# Patient Record
(demographics unavailable — no encounter records)

---

## 2024-12-16 NOTE — PHYSICAL EXAM
[de-identified] : Right Upper Extremity o Shoulder :   Inspection/Palpation : no tenderness, no swelling, no deformities  Range of Motion : ACTIVE FORWARD ELEVATION: Measured at 80 degrees, ACTIVE EXTERNAL ROTATION: Measured at 30 degrees, ACTIVE INTERNAL ROTATION: Measured at L5.  Strength : external rotation 5/5, internal rotation 5/5, pain with supraspinatus 5/5  Stability : mild apprehension on ABER  Tests/Signs : Neer (-), Josue (+ mild), Sulcus (-)  o Upper Arm : no tenderness, no swelling, no deformities o Muscle Bulk : no atrophy o Sensation : sensation intact to light touch o Skin : no skin rash or discoloration o Vascular Exam : no edema, no cyanosis, radial and ulnar pulses normal No hyperlaxity [de-identified] : Procedure was performed at the [facility]  EXAM: SHOULDER RIGHT  PROCEDURE DATE: 12/14/2024  INTERPRETATION: HISTORY: Arthralgia of right shoulder region  TECHNIQUE: 3 views of the right shoulder.  COMPARISON: None  IMPRESSION:  No acute displaced fracture or dislocation. Joint spaces are normal.  PARESH PAGAN M.D., ATTENDING RADIOLOGIST This document has been electronically signed. Dec 14, 2024, 4:19PM

## 2024-12-16 NOTE — HISTORY OF PRESENT ILLNESS
[de-identified] : Mr. AYAKA SOLIS is a RHD 18 year male who presents to office complaining of right shoulder pain x 2 days. He was lifting weights with a 55 lb dumbbell when he felt a "pop" in his shoulder. He is not sure exactly where the pop was felt but highlights his posterior shoulder area as to wear it currently hurts him the most. He proceeded to go to a [facility] urgent care shortly thereafter where x-rays of the shoulder were performed showing no acute or chronic findings. He was placed in a shoulder sling and diagnosed with a suspected rotator cuff tear. He was advised to refrain from any activities involving his right shoulder and to follow up with an orthopedist. Patient notes a constant, aching, throbbing pain generally around his right shoulder which is worsened with attempted overhead movement or lifting. Denies radiating pain or distal neuropathy. He has tried rest, ice, heat, Tylenol, and Advil, which have mildly help to reduce his pain.  All review of systems, family history, social history, surgical history, past medical history, medications, and allergies not previously stated as positive are negative. They were reviewed by me today with the patient and documented accordingly.

## 2024-12-16 NOTE — HISTORY OF PRESENT ILLNESS
[de-identified] : Mr. AYAKA SOLIS is a RHD 18 year male who presents to office complaining of right shoulder pain x 2 days. He was lifting weights with a 55 lb dumbbell when he felt a "pop" in his shoulder. He is not sure exactly where the pop was felt but highlights his posterior shoulder area as to wear it currently hurts him the most. He proceeded to go to a [facility] urgent care shortly thereafter where x-rays of the shoulder were performed showing no acute or chronic findings. He was placed in a shoulder sling and diagnosed with a suspected rotator cuff tear. He was advised to refrain from any activities involving his right shoulder and to follow up with an orthopedist. Patient notes a constant, aching, throbbing pain generally around his right shoulder which is worsened with attempted overhead movement or lifting. Denies radiating pain or distal neuropathy. He has tried rest, ice, heat, Tylenol, and Advil, which have mildly help to reduce his pain.  All review of systems, family history, social history, surgical history, past medical history, medications, and allergies not previously stated as positive are negative. They were reviewed by me today with the patient and documented accordingly.

## 2024-12-16 NOTE — ADDENDUM
[FreeTextEntry1] : I, Daniella Solis, acted solely as a scribe for Dr. Kane Lang on this date 12/16/2024.

## 2024-12-16 NOTE — PHYSICAL EXAM
[de-identified] : Right Upper Extremity o Shoulder :   Inspection/Palpation : no tenderness, no swelling, no deformities  Range of Motion : ACTIVE FORWARD ELEVATION: Measured at 80 degrees, ACTIVE EXTERNAL ROTATION: Measured at 30 degrees, ACTIVE INTERNAL ROTATION: Measured at L5.  Strength : external rotation 5/5, internal rotation 5/5, pain with supraspinatus 5/5  Stability : mild apprehension on ABER  Tests/Signs : Neer (-), Josue (+ mild), Sulcus (-)  o Upper Arm : no tenderness, no swelling, no deformities o Muscle Bulk : no atrophy o Sensation : sensation intact to light touch o Skin : no skin rash or discoloration o Vascular Exam : no edema, no cyanosis, radial and ulnar pulses normal No hyperlaxity [de-identified] : Procedure was performed at the [facility]  EXAM: SHOULDER RIGHT  PROCEDURE DATE: 12/14/2024  INTERPRETATION: HISTORY: Arthralgia of right shoulder region  TECHNIQUE: 3 views of the right shoulder.  COMPARISON: None  IMPRESSION:  No acute displaced fracture or dislocation. Joint spaces are normal.  PARESH PAGAN M.D., ATTENDING RADIOLOGIST This document has been electronically signed. Dec 14, 2024, 4:19PM

## 2024-12-16 NOTE — DISCUSSION/SUMMARY
[de-identified] : The underlying pathophysiology was reviewed in great detail with the patient as well as the various treatment options, including ice, analgesics, NSAIDS, Physical therapy, and surgery. - Ordered MRI right shoulder r/o labral tear - Refer to PT A prescription was given for physical therapy.  F/U after MRI

## 2024-12-16 NOTE — DISCUSSION/SUMMARY
[de-identified] : The underlying pathophysiology was reviewed in great detail with the patient as well as the various treatment options, including ice, analgesics, NSAIDS, Physical therapy, and surgery. - Ordered MRI right shoulder r/o labral tear - Refer to PT A prescription was given for physical therapy.  F/U after MRI

## 2024-12-20 NOTE — HISTORY OF PRESENT ILLNESS
[(# ___since the last visit)] : [unfilled] visits to the emergency room since the last visit [(# ___ since the last visit)] : hospitalized [unfilled] times since the last visit [0 x/month] : 0 x/month [Minor Limitation] : minor limitation [< or = 2 days/wk] : < than or = 2 days/week [0 - 1/year] : 0 - 1/year [> or = 20] : > than or = 20 [FreeTextEntry1] : Mild persistent asthma, exercise-induced bronchospasm, allergic rhinitis, Seasonal allergies.  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  Last allergy eval 2017, Dr. White: +pollen. Immunotherapy considered.  COVID 19+ August 2024, received OCS.  PNA Oct 2024, received ABX and OCS.  Mild scoliosis: noted by PCP Jan 2024.    FOLLOW UP: Dec 20, 2024 Last seen July 2024 - Recs: Hold Advair for remainder of the summer. Continue daily Singulair 10mg. Sept 1st : restart Advair 115/21 2 puffs Qday. Albuterol PRN and pre-exercise. Flonase x3 weeks.    Interval hx: - Did well off ICS for summer.  - Restarted Aug 18th 2024 - Dx with COVID 19 8/31/24. Received OCS. Seen in .  - Dx with bronchitis 9/10/24 - Dr. Jacobs advised increasing Advair 115/21 2 puffs BID for the next 1-2 months.  - Dx with PNA 10/2/24 (LLL, per father) tx with ABX and OCS. Seen by school physician.  - No interval ER visits or hospital admissions.  - Overall doing well, however cough and SOB noted now more frequently during soccer since August 2024. Using ProAir respiclick prior to strenuous exercise but having more symptoms than in the past.  - No current chest pain or SOB.  - reports compliance with ICS and spacer while at school. However, has used only 1-2 times since returning home for the past week.    Daily meds: Advair 115/21 2 puffs BID.  Rescue meds: albuterol PRN (RespiClick): last used with sports more than one week ago.  Baseline daytime cough, SOB or wheeze: denies  Baseline nocturnal cough, SOB or wheeze: denies  Exertional cough, SOB or wheeze: YES, cough and SOB with exercise have worsened since +COVID Aug 2024.  BAN sx: denies  KORINA sx: denies  Allergic rhinitis symptoms: denies  Flu Vaccine 2024- 2025: No. Will obtain with PCP.    Modified Asthma Predictive Index (mAPI): 4 wheezing illnesses AND 1 major criteria Parental asthma: YES, father.  atopic dermatitis: NO Aeroallergen sensitization suspected: YES   OR   2 minor criteria Food sensitization: NO Peripheral blood eosinophilia =4%: Wheezing apart from colds:  NO [Shortness of Breath] : no shortness of breath [Dyspnea on Exertion] : no dyspnea on exertion [Cough] : no cough [Wheezing] : no wheezing [FreeTextEntry4] : improved with albuterol pre-exercise

## 2024-12-20 NOTE — BIRTH HISTORY
[At Term] : at term [Normal Vaginal Route] : by normal vaginal route [None] : there were no delivery complications [Age Appropriate] : age appropriate developmental milestones met [de-identified] : > 7 lbs [FreeTextEntry1] : [place]

## 2024-12-20 NOTE — REASON FOR VISIT
[Routine Follow-Up] : a routine follow-up visit for [Asthma/RAD] : asthma/RAD [Shortness of Breath] : shortness of breath [Patient] : patient [Medical Records] : medical records [Father] : father

## 2024-12-20 NOTE — SOCIAL HISTORY
[FreeTextEntry1] : [place] started Fall 2024.  [Smokers in Household] : there are no smokers in the home

## 2024-12-20 NOTE — REVIEW OF SYSTEMS
[NI] : Genitourinary  [Nl] : Endocrine [Snoring] : snoring [Wheezing] : wheezing [Chest Tightness] : chest tightness [Immunizations are up to date] : Immunizations are up to date [Nasal Congestion] : nasal congestion [Fever] : no fever [Heart Disease] : no heart disease [Pneumonia] : no pneumonia [Reflux] : no reflux [Seizure] : no seizures [Eczema] : no ezcema [FreeTextEntry4] : mild snoring, denies pauses/apneas.  [FreeTextEntry6] : no current symptoms. reports +cough and SOB with exercise.  [FreeTextEntry8] : presumed concussion X 2 in 9 months 2016.  resulting in headaches and sensitivity to light.  Resolved [FreeTextEntry1] : mild scoliosis noted by PCP Jan 2024.  [Influenza Vaccine this Past Year] : no influenza vaccine this past year

## 2024-12-20 NOTE — PHYSICAL EXAM
[Well Nourished] : well nourished [Well Developed] : well developed [Well Groomed] : well groomed [Alert] : ~L alert [Active] : active [Normal Breathing Pattern] : normal breathing pattern [No Respiratory Distress] : no respiratory distress [No Nasal Drainage] : no nasal drainage [No Oral Cyanosis] : no oral cyanosis [No Stridor] : no stridor [Absence Of Retractions] : absence of retractions [Symmetric] : symmetric [Good Expansion] : good expansion [No Acc Muscle Use] : no accessory muscle use [No Crackles] : no crackles [No Rhonchi] : no rhonchi [No Wheezing] : no wheezing [Normal Sinus Rhythm] : normal sinus rhythm [Full ROM] : full range of motion [No Clubbing] : no clubbing [Capillary Refill < 2 secs] : capillary refill less than two seconds [No Cyanosis] : no cyanosis [Abnormal Walk] : normal gait [Alert and  Oriented] : alert and oriented [FreeTextEntry5] : +postnasal drip. + cobble-stoning.  [FreeTextEntry7] : b/l hypo-aeration.

## 2025-02-26 NOTE — REASON FOR VISIT
[Routine Follow-Up] : a routine follow-up visit for [Asthma/RAD] : asthma/RAD [Shortness of Breath] : shortness of breath [Patient] : patient [Father] : father [Medical Records] : medical records

## 2025-02-28 NOTE — SOCIAL HISTORY
[FreeTextEntry1] : [place] started Fall 2024.  [Smokers in Household] : there are no smokers in the home

## 2025-02-28 NOTE — REVIEW OF SYSTEMS
[NI] : Genitourinary  [Nl] : Endocrine [Snoring] : snoring [Wheezing] : wheezing [Chest Tightness] : chest tightness [Immunizations are up to date] : Immunizations are up to date [Fever] : no fever [Nasal Congestion] : no nasal congestion [Heart Disease] : no heart disease [Pneumonia] : no pneumonia [Reflux] : no reflux [Seizure] : no seizures [Eczema] : no ezcema [FreeTextEntry4] : mild snoring, denies pauses/apneas.  [FreeTextEntry6] : no current symptoms. reports +cough and SOB with exercise.  [FreeTextEntry8] : presumed concussion X 2 in 9 months 2016.  resulting in headaches and sensitivity to light.  Resolved [FreeTextEntry1] : mild scoliosis noted by PCP Jan 2024.  [Influenza Vaccine this Past Year] : influenza vaccine this past year

## 2025-02-28 NOTE — BIRTH HISTORY
[At Term] : at term [Normal Vaginal Route] : by normal vaginal route [None] : there were no delivery complications [Age Appropriate] : age appropriate developmental milestones met [de-identified] : > 7 lbs [FreeTextEntry1] : [place]

## 2025-02-28 NOTE — BIRTH HISTORY
DATE: 20      PATIENT: Zhane Razo  MRN:  [id number]  :  1999      REASON FOR VISIT:    Cough etc.    HISTORY OF PRESENT ILLNESS:    Cognitively impaired and nonverbal.  Here with her mom.    Cough for a few days.  Fever for a couple of days.  No seizures etc.    REVIEW OF SYSTEMS:    Cardiac - No CP, Palpitation, or Dyspnea.  GI - No bleeding, Dysphagia, Bowel problems.  Neuro - No Headache, Syncope, Weakness etc.   - No problems with Urination.    ALLERGIES:  No Known Allergies      Current Outpatient Medications   Medication Sig Dispense Refill   • amoxicillin (AMOXIL) 250 MG capsule Take 1 capsule by mouth 3 times daily for 10 days. 30 capsule 0   • leuprolide, 3 Month, (LUPRON DEPOT) 11.25 MG injection      • DIAZepam (DIASTAT ACUDIAL) 10 MG rectal gel Dial and lock at 10mg.  Give 10mg IL X 1 prn for prolonged seizure lasting longer than 5 minutes or for more than 3 seizures in 10 minutes.     • levETIRAcetam (KEPPRA) 100 MG/ML solution 2.5ml PO BID X 1 week, then 5ml PO BID X 1 week, then 7ml PO BID thereafter       No current facility-administered medications for this visit.          History reviewed. No pertinent past medical history.    Med list, relevant past Medical, Family, Social history briefly reviewed.    History reviewed. No pertinent surgical history.      No family history on file.      Social History     Tobacco Use   • Smoking status: Never Smoker   • Smokeless tobacco: Never Used   Substance Use Topics   • Alcohol use: Never     Frequency: Never   • Drug use: Never         OBJECTIVE:  Visit Vitals  BP 91/60 (BP Location: RUE - Right upper extremity)   Pulse 64   Temp 100.1 °F (37.8 °C) (Oral)   Resp 16   Ht 5' 2\" (1.575 m)   SpO2 98%         PHYSICAL EXAM:    Constitutional - Vital signs reviewed. Alert. No acute distress.  Eyes - Eye movement, Sclera, and Conjunctiva unremarkable.  Head and Neck - Unremarkable. No neck rigidity.  ENT - Reddish left TM.  Lymphatic - No  [At Term] : at term [Normal Vaginal Route] : by normal vaginal route significant enlargement of nodes.  Heart - Normal Heart sounds, No JVD.  Pulmonary - Normal Auscultatation with no Wheezes, Rhonchii or Bronchial Breathing.    ASSESSMENT/PLAN:    Likely a Viral URI with Otitis Media.  Has fever.  Empiric Amoxil-No allergy per mom.  Drink plenty fluids.  ER if high fever, seizure etc.      Matheus Cruz MD   [None] : there were no delivery complications [Age Appropriate] : age appropriate developmental milestones met [de-identified] : > 7 lbs [FreeTextEntry1] : [place]aim

## 2025-02-28 NOTE — HISTORY OF PRESENT ILLNESS
[FreeTextEntry1] : Mild persistent asthma, exercise-induced bronchospasm, allergic rhinitis, Seasonal allergies.  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  Last allergy eval 2017, Dr. White: +pollen. Immunotherapy considered.  COVID 19+ August 2024, received OCS.  PNA Oct 2024, received ABX and OCS. reportedly confirmed on CXR.  Mild scoliosis: noted by PCP Jan 2024.  +Fam hx of asthma: father.     FOLLOW UP: Feb 27, 2025 Last seen Dec 2024 - Recs: Stepped up to Advair 230/21 2 puffs TWO times a day. Singulair 10mg Qpm. Proair Respiclick pre-exercise and PRN. father to email Oct 2024 CXR report.  decrease to advair 230/21 2 puffs Qday if doing well?    Interval hx: - telehealth visit today.  - reports compliance with - No interval oral steroids. - No interval ER visits/hospitalizations. Daily meds: Rescue meds: Baseline daytime cough, SOB or wheeze: Baseline nocturnal cough, SOB or wheeze: Exertional cough, SOB or wheeze: BAN sx: KORINA sx: Allergic rhinitis symptoms: COVID 19 vaccine: Flu Vaccine 2024- 2025: YES    Modified Asthma Predictive Index (mAPI): 4 wheezing illnesses AND 1 .major criteria Parental asthma: YES, father.  atopic dermatitis: NO Aeroallergen sensitization suspected: YES   OR   2 minor criteria Food sensitization: NO Peripheral blood eosinophilia =4%: Wheezing apart from colds:  NO [(# ___since the last visit)] : [unfilled] visits to the emergency room since the last visit [(# ___ since the last visit)] : hospitalized [unfilled] times since the last visit [0 x/month] : 0 x/month [Minor Limitation] : minor limitation [< or = 2 days/wk] : < than or = 2 days/week [0 - 1/year] : 0 - 1/year [> or = 20] : > than or = 20 [Shortness of Breath] : no shortness of breath [Dyspnea on Exertion] : no dyspnea on exertion [Cough] : no cough [Wheezing] : no wheezing [FreeTextEntry4] : improved with albuterol pre-exercise

## 2025-02-28 NOTE — PHYSICAL EXAM
[FreeTextEntry1] : looks well via telehealth, well nourished, well developed, well groomed, alert, active, normal breathing pattern and no respiratory distress.

## 2025-02-28 NOTE — REVIEW OF SYSTEMS
[NI] : Genitourinary  [Nl] : Endocrine [Snoring] : snoring [Nasal Congestion] : nasal congestion [Wheezing] : wheezing [Chest Tightness] : chest tightness [Immunizations are up to date] : Immunizations are up to date [Fever] : no fever [Heart Disease] : no heart disease [Pneumonia] : no pneumonia [Reflux] : no reflux [Seizure] : no seizures [Eczema] : no ezcema [FreeTextEntry4] : mild snoring, denies pauses/apneas.  [FreeTextEntry6] : no current symptoms. reports +cough and SOB with exercise.  [FreeTextEntry8] : presumed concussion X 2 in 9 months 2016.  resulting in headaches and sensitivity to light.  Resolved [FreeTextEntry1] : mild scoliosis noted by PCP Jan 2024.  [Influenza Vaccine this Past Year] : no influenza vaccine this past year

## 2025-02-28 NOTE — HISTORY OF PRESENT ILLNESS
[(# ___since the last visit)] : [unfilled] visits to the emergency room since the last visit [(# ___ since the last visit)] : hospitalized [unfilled] times since the last visit [0 x/month] : 0 x/month [Minor Limitation] : minor limitation [< or = 2 days/wk] : < than or = 2 days/week [0 - 1/year] : 0 - 1/year [> or = 20] : > than or = 20 [FreeTextEntry1] : Mild persistent asthma, exercise-induced bronchospasm, allergic rhinitis, seasonal allergies.  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  Last allergy eval 2017, Dr. White: +pollen. Immunotherapy considered.  COVID 19+ August 2024, received OCS.  PNA Oct 2024, received ABX and OCS. reportedly confirmed on CXR.  Mild scoliosis: noted by PCP Jan 2024.  +Fam hx of asthma: father.     FOLLOW UP: Feb 28, 2025: telehealth.  Last seen Dec 2024 - Recs: Stepped up to Advair 230/21 2 puffs TWO times a day. Singulair 10mg Qpm. Proair Respiclick pre-exercise and PRN. father to email Oct 2024 CXR report.   Interval hx: - reports compliance with ICS and spacer.  - No interval oral steroids. - No interval ER visits/hospitalizations. - Overall doing well, but continues to not "feel 100% with soccer" since illnesses in August and October 2024. Using albuterol 15mins prior to soccer and again once about 30mins into soccer. Otherwise not needed.  No issues when weightlifting.  Daily meds: Advair 230/21 2puffs BID, Singulair 10mg Qday.  Rescue meds: Proair Respiclick  Baseline daytime cough, SOB or wheeze: denies  Baseline nocturnal cough, SOB or wheeze: infrequent Exertional cough, SOB or wheeze: yes with soccer.  BAN sx: denies  KORINA sx: denies  Allergic rhinitis symptoms: no current symptoms, uses Zyrtec daily March-May Flu Vaccine 2024- 2025: YES    Modified Asthma Predictive Index (mAPI): 4 wheezing illnesses AND 1 .major criteria Parental asthma: YES, father.  atopic dermatitis: NO Aeroallergen sensitization suspected: YES   OR   2 minor criteria Food sensitization: NO Peripheral blood eosinophilia =4%: Wheezing apart from colds:  NO [Shortness of Breath] : no shortness of breath [Dyspnea on Exertion] : no dyspnea on exertion [Cough] : no cough [Wheezing] : no wheezing [FreeTextEntry4] : improved with albuterol pre-exercise

## 2025-02-28 NOTE — SOCIAL HISTORY
[FreeTextEntry1] : [employer] started Fall 2024.  [Smokers in Household] : there are no smokers in the home

## 2025-02-28 NOTE — REASON FOR VISIT
[Routine Follow-Up] : a routine follow-up visit for [Asthma/RAD] : asthma/RAD [Shortness of Breath] : shortness of breath [Patient] : patient [Medical Records] : medical records [Other Location: e.g. School (Enter Location, City,State)___] : at [unfilled], at the time of the visit. [Medical Office: ([facility])___] : at the medical office located in  [Telehealth (audio & video)] : This visit was provided via telehealth using real-time 2-way audio visual technology. [Verbal consent obtained from patient] : the patient, [unfilled]

## 2025-03-21 NOTE — SOCIAL HISTORY
[FreeTextEntry1] : [place] started Fall 2024.  [Smokers in Household] : there are no smokers in the home

## 2025-03-21 NOTE — HISTORY OF PRESENT ILLNESS
[(# ___since the last visit)] : [unfilled] visits to the emergency room since the last visit [(# ___ since the last visit)] : hospitalized [unfilled] times since the last visit [0 x/month] : 0 x/month [Minor Limitation] : minor limitation [< or = 2 days/wk] : < than or = 2 days/week [0 - 1/year] : 0 - 1/year [> or = 20] : > than or = 20 [FreeTextEntry1] : Asthma, exercise-induced bronchospasm, allergic rhinitis, seasonal allergies.  S/p T&A 2017  Cardiac evaluation obtained in 2017 to see if any cardiac pathology is contributing to his symptoms with exercise. No primary cardiac pathology found on EKG and ECHO.  Attends [employer] ([place], [place]) on soccer team, majoring in physical therapy.  Last allergy eval 2017, Dr. White: +pollen. Immunotherapy considered. Follow up pending.  COVID 19+ August 2024, received OCS.  PNA Oct 2024, received ABX and OCS. reportedly confirmed on CXR.  Mild scoliosis: noted by PCP Jan 2024.  +Fam hx of asthma: father.      FOLLOW UP: Mar 18, 2025 Last seen Feb 2025 - Recs: Allergy follow up (discuss IT) stepped up to Advair 230/21 2 puffs TWO times a day. Singulair 10mg Qpm Flonase x2 weeks. Proair Respiclick pre-exercise and PRN. father to email Oct 2024 CXR report.   Interval hx: - overall doing well. Home for spring break this week.  - ACT = 19 (however SOB is only with soccer).  - albuterol needed only prior to soccer. No longer needs second dose during game.  - allergy f/u pending, plans to schedule for June 2025.  - reports compliance with ICS and spacer.  - No interval oral steroids. - No interval ER visits/hospitalizations.  Daily meds: Advair 230/21 2puffs BID, Singulair 10mg Qday. Zyrtec March -May  Rescue meds: Proair Respiclick  Baseline daytime cough, SOB or wheeze: denies  Baseline nocturnal cough, SOB or wheeze: denies  Exertional cough, SOB or wheeze: no, using albuterol 15mins to soccer with good effect.  BAN sx: denies  KORINA sx: denies  Allergic rhinitis symptoms: no current symptoms, uses Zyrtec daily March-May Flu Vaccine 2024- 2025: YES    Modified Asthma Predictive Index (mAPI): 4 wheezing illnesses AND 1 major criteria Parental asthma: YES, father atopic dermatitis: NO Aeroallergen sensitization suspected: YES   OR   2 minor criteria Food sensitization: NO Peripheral blood eosinophilia =4%: Wheezing apart from colds:  NO [Shortness of Breath] : no shortness of breath [Dyspnea on Exertion] : no dyspnea on exertion [Cough] : no cough [Wheezing] : no wheezing [FreeTextEntry4] : improved with albuterol pre-exercise

## 2025-03-21 NOTE — SOCIAL HISTORY
[FreeTextEntry1] : [place] started Fall 2024.  [Smokers in Household] : there are no smokers in the home

## 2025-03-21 NOTE — PHYSICAL EXAM
[Well Nourished] : well nourished [Well Developed] : well developed [Well Groomed] : well groomed [Alert] : ~L alert [Active] : active [Normal Breathing Pattern] : normal breathing pattern [No Respiratory Distress] : no respiratory distress [No Oral Cyanosis] : no oral cyanosis [Absence Of Retractions] : absence of retractions [Symmetric] : symmetric [Good Expansion] : good expansion [No Acc Muscle Use] : no accessory muscle use [Good aeration to bases] : good aeration to bases [Equal Breath Sounds] : equal breath sounds bilaterally [No Crackles] : no crackles [No Rhonchi] : no rhonchi [No Wheezing] : no wheezing [Normal Sinus Rhythm] : normal sinus rhythm [Soft, Non-Tender] : soft, non-tender [Full ROM] : full range of motion [No Clubbing] : no clubbing [Capillary Refill < 2 secs] : capillary refill less than two seconds [No Cyanosis] : no cyanosis [Abnormal Walk] : normal gait [Alert and  Oriented] : alert and oriented [FreeTextEntry5] : +postnasal drip

## 2025-03-21 NOTE — BIRTH HISTORY
[At Term] : at term [Normal Vaginal Route] : by normal vaginal route [None] : there were no delivery complications [Age Appropriate] : age appropriate developmental milestones met [de-identified] : > 7 lbs [FreeTextEntry1] : [place]

## 2025-03-21 NOTE — REVIEW OF SYSTEMS
[NI] : Genitourinary  [Nl] : Endocrine [Wheezing] : wheezing [Immunizations are up to date] : Immunizations are up to date [Influenza Vaccine this Past Year] : influenza vaccine this past year [FreeTextEntry4] : mild snoring, denies pauses/apneas.  [Fever] : no fever [Snoring] : no snoring [Apnea] : no apnea [Nasal Congestion] : no nasal congestion [Heart Disease] : no heart disease [Pneumonia] : no pneumonia [Chest Tightness] : no chest tightness [Reflux] : no reflux [Seizure] : no seizures [Eczema] : no ezcema [FreeTextEntry6] : no current symptoms [FreeTextEntry8] : presumed concussion X 2 in 9 months 2016.  resulting in headaches and sensitivity to light.  Resolved [FreeTextEntry1] : mild scoliosis noted by PCP Jan 2024.

## 2025-03-21 NOTE — HISTORY OF PRESENT ILLNESS
[(# ___since the last visit)] : [unfilled] visits to the emergency room since the last visit [(# ___ since the last visit)] : hospitalized [unfilled] times since the last visit [0 x/month] : 0 x/month [Minor Limitation] : minor limitation [< or = 2 days/wk] : < than or = 2 days/week [0 - 1/year] : 0 - 1/year [> or = 20] : > than or = 20 [FreeTextEntry1] : Asthma, exercise-induced bronchospasm, allergic rhinitis, seasonal allergies.  S/p T&A 2017  Cardiac evaluation obtained in 2017 to see if any cardiac pathology is contributing to his symptoms with exercise. No primary cardiac pathology found on EKG and ECHO.  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  Last allergy eval 2017, Dr. Whiet: +pollen. Immunotherapy considered. Follow up pending.  COVID 19+ August 2024, received OCS.  PNA Oct 2024, received ABX and OCS. reportedly confirmed on CXR.  Mild scoliosis: noted by PCP Jan 2024.  +Fam hx of asthma: father.      FOLLOW UP: Mar 18, 2025 Last seen Feb 2025 - Recs: Allergy follow up (discuss IT) stepped up to Advair 230/21 2 puffs TWO times a day. Singulair 10mg Qpm Flonase x2 weeks. Proair Respiclick pre-exercise and PRN. father to email Oct 2024 CXR report.   Interval hx: - overall doing well. Home for spring break this week.  - ACT = 19 (however SOB is only with soccer).  - albuterol needed only prior to soccer. No longer needs second dose during game.  - allergy f/u pending, plans to schedule for June 2025.  - reports compliance with ICS and spacer.  - No interval oral steroids. - No interval ER visits/hospitalizations.  Daily meds: Advair 230/21 2puffs BID, Singulair 10mg Qday. Zyrtec March -May  Rescue meds: Proair Respiclick  Baseline daytime cough, SOB or wheeze: denies  Baseline nocturnal cough, SOB or wheeze: denies  Exertional cough, SOB or wheeze: no, using albuterol 15mins to soccer with good effect.  BAN sx: denies  KORINA sx: denies  Allergic rhinitis symptoms: no current symptoms, uses Zyrtec daily March-May Flu Vaccine 2024- 2025: YES    Modified Asthma Predictive Index (mAPI): 4 wheezing illnesses AND 1 major criteria Parental asthma: YES, father atopic dermatitis: NO Aeroallergen sensitization suspected: YES   OR   2 minor criteria Food sensitization: NO Peripheral blood eosinophilia =4%: Wheezing apart from colds:  NO [Shortness of Breath] : no shortness of breath [Dyspnea on Exertion] : no dyspnea on exertion [Cough] : no cough [Wheezing] : no wheezing [FreeTextEntry4] : improved with albuterol pre-exercise

## 2025-03-21 NOTE — REASON FOR VISIT
[Routine Follow-Up] : a routine follow-up visit for [Patient] : patient [Medical Records] : medical records [Asthma/RAD] : asthma/RAD [Father] : father

## 2025-03-21 NOTE — BIRTH HISTORY
[At Term] : at term [Normal Vaginal Route] : by normal vaginal route [None] : there were no delivery complications [Age Appropriate] : age appropriate developmental milestones met [de-identified] : > 7 lbs [FreeTextEntry1] : [place]

## 2025-06-10 NOTE — HISTORY OF PRESENT ILLNESS
[(# ___since the last visit)] : [unfilled] visits to the emergency room since the last visit [(# ___ since the last visit)] : hospitalized [unfilled] times since the last visit [0 x/month] : 0 x/month [Minor Limitation] : minor limitation [< or = 2 days/wk] : < than or = 2 days/week [0 - 1/year] : 0 - 1/year [> or = 20] : > than or = 20 [FreeTextEntry1] : Asthma, exercise-induced bronchospasm, allergic rhinitis, seasonal allergies.  S/p T&A 2017  Cardiac evaluation obtained in 2017 to see if any cardiac pathology is contributing to his symptoms with exercise. No primary cardiac pathology found on EKG and ECHO.  Attends [employer] ([place], [place]) on soccer team, majoring in physical therapy.  Last allergy eval 2017, Dr. White: +pollen. Immunotherapy considered. Follow up pending.  COVID 19+ August 2024, received OCS.  PNA Oct 2024, received ABX and OCS. reportedly confirmed on CXR.  Mild scoliosis: noted by PCP Jan 2024.  +Fam hx of asthma: father.      FOLLOW UP: Jun 09, 2025 Last seen March 2025 - Recs: Allergy follow up (discuss IT), continue: Advair 230/21 2 puffs TWO times a day. Singulair 10mg Qpm Flonase x2 weeks. Proair Respiclick pre-exercise and PRN. father to email Oct 2024 CXR report.   Interval hx: - overall doing very well.  - ACT = 21 - ProAir RespiClick used 15mins prior to soccer with good effect.  - allergy f/u pending.  - reports compliance with ICS and spacer.  - No interval oral steroids. - No interval ER visits/hospitalizations.  Daily meds: Advair 230/21 2puffs BID, Singulair 10mg Qday. Zyrtec March -May  Rescue meds: Proair Respiclick  Baseline daytime cough, SOB or wheeze: denies  Baseline nocturnal cough, SOB or wheeze: denies  Exertional cough, SOB or wheeze: denies - using albuterol 15mins to soccer with good effect.  BAN sx: denies  KORINA sx: denies  Allergic rhinitis symptoms: no current symptoms, uses Zyrtec daily March-May Flu Vaccine 2024- 2025: YES    Modified Asthma Predictive Index (mAPI): 4 wheezing illnesses AND 1 major criteria Parental asthma: YES, father atopic dermatitis: NO Aeroallergen sensitization suspected: YES   OR   2 minor criteria Food sensitization: NO Peripheral blood eosinophilia =4%: Wheezing apart from colds:  NO [Shortness of Breath] : no shortness of breath [Dyspnea on Exertion] : no dyspnea on exertion [Cough] : no cough [Wheezing] : no wheezing [FreeTextEntry4] : improved with albuterol pre-exercise

## 2025-06-10 NOTE — REVIEW OF SYSTEMS
[NI] : Genitourinary  [Nl] : Endocrine [Wheezing] : wheezing [Immunizations are up to date] : Immunizations are up to date [Influenza Vaccine this Past Year] : influenza vaccine this past year [Fever] : no fever [Snoring] : no snoring [Apnea] : no apnea [Nasal Congestion] : no nasal congestion [Heart Disease] : no heart disease [Pneumonia] : no pneumonia [Chest Tightness] : no chest tightness [Reflux] : no reflux [Seizure] : no seizures [Eczema] : no ezcema [FreeTextEntry6] : no current symptoms [FreeTextEntry1] : mild scoliosis noted by PCP Jan 2024.  [FreeTextEntry8] : presumed concussion X 2 in 9 months 2016.  resulting in headaches and sensitivity to light.  Resolved

## 2025-06-10 NOTE — REVIEW OF SYSTEMS
[NI] : Genitourinary  [Nl] : Endocrine [Wheezing] : wheezing [Immunizations are up to date] : Immunizations are up to date [Influenza Vaccine this Past Year] : influenza vaccine this past year [Fever] : no fever [Snoring] : no snoring [Nasal Congestion] : no nasal congestion [Apnea] : no apnea [Heart Disease] : no heart disease [Pneumonia] : no pneumonia [Reflux] : no reflux [Chest Tightness] : no chest tightness [Seizure] : no seizures [Eczema] : no ezcema [FreeTextEntry6] : no current symptoms [FreeTextEntry1] : mild scoliosis noted by PCP Jan 2024.  [FreeTextEntry8] : presumed concussion X 2 in 9 months 2016.  resulting in headaches and sensitivity to light.  Resolved

## 2025-06-10 NOTE — BIRTH HISTORY
[At Term] : at term [Normal Vaginal Route] : by normal vaginal route [None] : there were no delivery complications [Age Appropriate] : age appropriate developmental milestones met [FreeTextEntry1] : Chaim [de-identified] : > 7 lbs

## 2025-06-10 NOTE — SOCIAL HISTORY
[Smokers in Household] : there are no smokers in the home [FreeTextEntry1] : [place] started Fall 2024.

## 2025-06-10 NOTE — BIRTH HISTORY
[At Term] : at term [Normal Vaginal Route] : by normal vaginal route [None] : there were no delivery complications [Age Appropriate] : age appropriate developmental milestones met [FreeTextEntry1] : Chaim [de-identified] : > 7 lbs

## 2025-06-10 NOTE — SOCIAL HISTORY
[FreeTextEntry1] : [place] started Fall 2024.  [Smokers in Household] : there are no smokers in the home

## 2025-06-10 NOTE — HISTORY OF PRESENT ILLNESS
[(# ___since the last visit)] : [unfilled] visits to the emergency room since the last visit [(# ___ since the last visit)] : hospitalized [unfilled] times since the last visit [0 x/month] : 0 x/month [Minor Limitation] : minor limitation [< or = 2 days/wk] : < than or = 2 days/week [0 - 1/year] : 0 - 1/year [> or = 20] : > than or = 20 [FreeTextEntry1] : Asthma, exercise-induced bronchospasm, allergic rhinitis, seasonal allergies.  S/p T&A 2017  Cardiac evaluation obtained in 2017 to see if any cardiac pathology is contributing to his symptoms with exercise. No primary cardiac pathology found on EKG and ECHO.  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  Last allergy eval 2017, Dr. White: +pollen. Immunotherapy considered. Follow up pending.  COVID 19+ August 2024, received OCS.  PNA Oct 2024, received ABX and OCS. reportedly confirmed on CXR.  Mild scoliosis: noted by PCP Jan 2024.  +Fam hx of asthma: father.      FOLLOW UP: Jun 09, 2025 Last seen March 2025 - Recs: Allergy follow up (discuss IT), continue: Advair 230/21 2 puffs TWO times a day. Singulair 10mg Qpm Flonase x2 weeks. Proair Respiclick pre-exercise and PRN. father to email Oct 2024 CXR report.   Interval hx: - overall doing very well.  - ACT = 21 - ProAir RespiClick used 15mins prior to soccer with good effect.  - allergy f/u pending.  - reports compliance with ICS and spacer.  - No interval oral steroids. - No interval ER visits/hospitalizations.  Daily meds: Advair 230/21 2puffs BID, Singulair 10mg Qday. Zyrtec March -May  Rescue meds: Proair Respiclick  Baseline daytime cough, SOB or wheeze: denies  Baseline nocturnal cough, SOB or wheeze: denies  Exertional cough, SOB or wheeze: denies - using albuterol 15mins to soccer with good effect.  BAN sx: denies  KORINA sx: denies  Allergic rhinitis symptoms: no current symptoms, uses Zyrtec daily March-May Flu Vaccine 2024- 2025: YES    Modified Asthma Predictive Index (mAPI): 4 wheezing illnesses AND 1 major criteria Parental asthma: YES, father atopic dermatitis: NO Aeroallergen sensitization suspected: YES   OR   2 minor criteria Food sensitization: NO Peripheral blood eosinophilia =4%: Wheezing apart from colds:  NO [Shortness of Breath] : no shortness of breath [Dyspnea on Exertion] : no dyspnea on exertion [Cough] : no cough [Wheezing] : no wheezing [FreeTextEntry4] : improved with albuterol pre-exercise

## 2025-06-10 NOTE — PHYSICAL EXAM
[Well Nourished] : well nourished [Well Developed] : well developed [Well Groomed] : well groomed [Alert] : ~L alert [Active] : active [Normal Breathing Pattern] : normal breathing pattern [No Respiratory Distress] : no respiratory distress [No Nasal Drainage] : no nasal drainage [No Oral Cyanosis] : no oral cyanosis [Absence Of Retractions] : absence of retractions [Symmetric] : symmetric [Good Expansion] : good expansion [No Acc Muscle Use] : no accessory muscle use [Good aeration to bases] : good aeration to bases [Equal Breath Sounds] : equal breath sounds bilaterally [No Rhonchi] : no rhonchi [No Crackles] : no crackles [No Wheezing] : no wheezing [Normal Sinus Rhythm] : normal sinus rhythm [Full ROM] : full range of motion [Capillary Refill < 2 secs] : capillary refill less than two seconds [No Cyanosis] : no cyanosis [Alert and  Oriented] : alert and oriented [Abnormal Walk] : normal gait [FreeTextEntry5] : +PND and cobble-stoning

## 2025-06-10 NOTE — PHYSICAL EXAM
[Well Nourished] : well nourished [Well Developed] : well developed [Well Groomed] : well groomed [Alert] : ~L alert [Active] : active [Normal Breathing Pattern] : normal breathing pattern [No Respiratory Distress] : no respiratory distress [No Nasal Drainage] : no nasal drainage [No Oral Cyanosis] : no oral cyanosis [Symmetric] : symmetric [Absence Of Retractions] : absence of retractions [Good Expansion] : good expansion [No Acc Muscle Use] : no accessory muscle use [Good aeration to bases] : good aeration to bases [Equal Breath Sounds] : equal breath sounds bilaterally [No Rhonchi] : no rhonchi [No Crackles] : no crackles [Normal Sinus Rhythm] : normal sinus rhythm [No Wheezing] : no wheezing [Full ROM] : full range of motion [No Cyanosis] : no cyanosis [Capillary Refill < 2 secs] : capillary refill less than two seconds [Abnormal Walk] : normal gait [Alert and  Oriented] : alert and oriented [FreeTextEntry5] : +PND and cobble-stoning